# Patient Record
(demographics unavailable — no encounter records)

---

## 2025-03-17 NOTE — PHYSICAL EXAM
[Right] : right foot and ankle [NL (40)] : plantar flexion 40 degrees [NL 30)] : inversion 30 degrees [NL (20)] : eversion 20 degrees [5___] : eversion 5[unfilled]/5 [2+] : dorsalis pedis pulse: 2+ [] : no deltoid ligament tenderness [de-identified] : normal tyler [TWNoteComboBox7] : dorsiflexion 15 degrees

## 2025-03-17 NOTE — ASSESSMENT
[FreeTextEntry1] : wbat mobic rx given airheel rx given due to cost PT f/up 6 wks  avoid jumping/cutting activity  The patient's current medication management of their orthopedic diagnosis was reviewed today:  (1) We discussed a comprehensive treatment plan that included possible pharmaceutical management involving the use of prescription strength medications including but not limited to options such as oral Naprosyn 500mg BID, once daily Meloxicam 15 mg, or 500-650 mg Tylenol versus over the counter oral medications and topical prescription NSAID Pennsaid vs over the counter Voltaren gel. (2) There is a moderate risk of morbidity with further treatment, especially from use of prescription strength medications and possible side effects of these medications which include upset stomach with oral medications, skin reactions to topical medications and cardiac/renal issues with long term use. (3) I recommended that the patient follow-up with their medical physician to discuss any significant specific potential issues with long term medication use such as interactions with current medications or with exacerbation of underlying medical comorbidities. (4) The benefits and risks associated with use of injectable, oral or topical, prescription and over the counter anti-inflammatory medications were discussed with the patient. The patient voiced understanding of the risks including but not limited to bleeding, stroke, kidney dysfunction, heart disease, and were referred to the black box warning label for further information.

## 2025-03-17 NOTE — HISTORY OF PRESENT ILLNESS
[de-identified] : 03/17/2025:  1 month achilles pain assoc w/ [facility]. no injury. no tx to date. no prior issues. 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

## 2025-06-05 NOTE — ASSESSMENT
[FreeTextEntry1] : We have discussed the nature of his condition Prescription for diclofenac is provided  The patient's orthopaedic condition(s) warrants intermittent use of a prescription strength non-steroidal anti-inflammatory medication.  These medications are associated with risks including but not limited to gastrointestinal irritation, kidney damage, hypertension, and bleeding.  The patient understands and will take medications as prescribed.  The patient will stop the medication and consult a physician as needed if problems arise. - Prescription for physical therapy is provided For his acute symptoms will administer cortisone injection today to the right subacromial space Follow-up [place] location with Dr. Noreiga

## 2025-06-05 NOTE — PHYSICAL EXAM
[Right] : right shoulder [Sitting] : sitting [Moderate] : moderate [] : no swelling [FreeTextEntry9] : Limited internal rotation secondary to pain [de-identified] : 5-/5 [TWNoteComboBox7] : active forward flexion 100 degrees [TWNoteComboBox4] : passive forward flexion 145 degrees

## 2025-06-05 NOTE — HISTORY OF PRESENT ILLNESS
[8] : 8 [Dull/Aching] : dull/aching [Localized] : localized [Sharp] : sharp [Intermittent] : intermittent [Full time] : Work status: full time [de-identified] :  06/05/2025: Right-hand-dominant male for evaluation of right shoulder pain.  He states 6 to 7 years ago he had a bursitis episode and did well with a cortisone injection.  He notes 7-month baseline of recent pain that has been worse and more limiting over the last several weeks. states limited range of motion with above shoulder level activities, and activities of rotation such as dressing and reaching behind them. Pt has pain that wakes them up at night. They have tried otc nsaids with minimal relief.  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 pt plays Raquetball.  [de-identified] : movement

## 2025-06-05 NOTE — IMAGING
[Right] : right shoulder [FreeTextEntry1] : Osteophyte noted off of inferior aspect of the acromion consistent with impingement and a small inferior humeral head osteophyte consistent with early osteoarthritis

## 2025-06-27 NOTE — PHYSICAL EXAM
[Right] : right shoulder [Sitting] : sitting [Moderate] : moderate [4 ___] : forward flexion 4[unfilled]/5 [4___] : external rotation 4[unfilled]/5 [] : positive impingement testing [FreeTextEntry9] : Limited internal rotation secondary to pain [de-identified] : 5-/5 [TWNoteComboBox7] : active forward flexion 120 degrees [TWNoteComboBox4] : False [de-identified] : active abduction 90 degrees

## 2025-06-27 NOTE — IMAGING
[Right] : right shoulder [FreeTextEntry1] : very early inferior humeral head spur c/w OA although joint space maintained

## 2025-06-27 NOTE — HISTORY OF PRESENT ILLNESS
[de-identified] :  06/05/2025: Right-hand-dominant male for evaluation of right shoulder pain.  He states 6 to 7 years ago he had a bursitis episode and did well with a cortisone injection.  He notes 7-month baseline of recent pain that has been worse and more limiting over the last several weeks. states limited range of motion with above shoulder level activities, and activities of rotation such as dressing and reaching behind them. Pt has pain that wakes them up at night. They have tried otc nsaids with minimal relief.  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 Still with pain overhead, and positioning arm at night for sleep. No neck pain [8] : 8 [Dull/Aching] : dull/aching [Localized] : localized [Sharp] : sharp [Intermittent] : intermittent [] : no [Full time] : Work status: full time [FreeTextEntry1] : R shoulder [FreeTextEntry5] : Patient complains of shoulder pain for 6-7 months, no specific injury/trauma, worse in the last couple of weeks. pt plays Raquetball.  [de-identified] : movement

## 2025-07-02 NOTE — HISTORY OF PRESENT ILLNESS
[de-identified] : 03/17/2025:  1 month achilles pain assoc w/ [facility]. no injury. no tx to date. no prior issues. 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 going to PT. using airheel  05/07/2025: mri f/up. had relief from mdp  07/02/2025:  no sig change. doing HEP.  [] : no [FreeTextEntry1] : right ankle/heel

## 2025-07-02 NOTE — PHYSICAL EXAM
[Right] : right foot and ankle [NL (40)] : plantar flexion 40 degrees [NL 30)] : inversion 30 degrees [NL (20)] : eversion 20 degrees [5___] : eversion 5[unfilled]/5 [2+] : dorsalis pedis pulse: 2+ [] : no deltoid ligament tenderness [FreeTextEntry8] : improved [de-identified] : normal tyler [TWNoteComboBox7] : dorsiflexion 15 degrees

## 2025-07-02 NOTE — DATA REVIEWED
[MRI] : MRI [Right] : of the right [Ankle] : ankle [I reviewed the films/CD and additionally noted] : I reviewed the films/CD and additionally noted [FreeTextEntry1] : nancy deformity/achilles tendinopathy

## 2025-07-02 NOTE — ASSESSMENT
[FreeTextEntry1] : wbat HEP mobic rx discussed surgical treatment of excision nancy and achilles debridement/repair patient will f/up 6-8 wks to check on progress  The patient's current medication management of their orthopedic diagnosis was reviewed today:  (1) We discussed a comprehensive treatment plan that included possible pharmaceutical management involving the use of prescription strength medications including but not limited to options such as oral Naprosyn 500mg BID, once daily Meloxicam 15 mg, or 500-650 mg Tylenol versus over the counter oral medications and topical prescription NSAID Pennsaid vs over the counter Voltaren gel. (2) There is a moderate risk of morbidity with further treatment, especially from use of prescription strength medications and possible side effects of these medications which include upset stomach with oral medications, skin reactions to topical medications and cardiac/renal issues with long term use. (3) I recommended that the patient follow-up with their medical physician to discuss any significant specific potential issues with long term medication use such as interactions with current medications or with exacerbation of underlying medical comorbidities. (4) The benefits and risks associated with use of injectable, oral or topical, prescription and over the counter anti-inflammatory medications were discussed with the patient. The patient voiced understanding of the risks including but not limited to bleeding, stroke, kidney dysfunction, heart disease, and were referred to the black box warning label for further information.

## 2025-07-07 NOTE — ASSESSMENT
[FreeTextEntry1] : I reviewed the MRI of his right shoulder Diagnosis/prognosis reviewed Will have him work on rotator cuff strengthening program Possibility of nerve injury due to the paralabral cyst discussed ok to play racketball Possibility of surgery in future discussed

## 2025-07-07 NOTE — PHYSICAL EXAM
[Right] : right shoulder [Sitting] : sitting [Moderate] : moderate [4 ___] : forward flexion 4[unfilled]/5 [4___] : external rotation 4[unfilled]/5 [] : no swelling [FreeTextEntry9] : Limited internal rotation secondary to pain [de-identified] : 5-/5 [TWNoteComboBox7] : active forward flexion 120 degrees [de-identified] : active abduction 90 degrees

## 2025-07-07 NOTE — HISTORY OF PRESENT ILLNESS
[8] : 8 [Dull/Aching] : dull/aching [Localized] : localized [Sharp] : sharp [Intermittent] : intermittent [Full time] : Work status: full time [de-identified] : 06/05/2025: Right-hand-dominant male for evaluation of right shoulder pain.  He states 6 to 7 years ago he had a bursitis episode and did well with a cortisone injection.  He notes 7-month baseline of recent pain that has been worse and more limiting over the last several weeks. states limited range of motion with above shoulder level activities, and activities of rotation such as dressing and reaching behind them. Pt has pain that wakes them up at night. They have tried otc nsaids with minimal relief.  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 Still with pain overhead, and positioning arm at night for sleep. No neck pain 07/07/2025 Had MRI: no full thickness rotator cuff tear. 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 pt plays Raquetball.  [de-identified] : movement